# Patient Record
Sex: FEMALE | Race: WHITE | NOT HISPANIC OR LATINO | Employment: OTHER | URBAN - METROPOLITAN AREA
[De-identification: names, ages, dates, MRNs, and addresses within clinical notes are randomized per-mention and may not be internally consistent; named-entity substitution may affect disease eponyms.]

---

## 2018-03-20 ENCOUNTER — OFFICE VISIT (OUTPATIENT)
Dept: UROLOGY | Facility: MEDICAL CENTER | Age: 73
End: 2018-03-20
Payer: COMMERCIAL

## 2018-03-20 VITALS
HEIGHT: 64 IN | SYSTOLIC BLOOD PRESSURE: 124 MMHG | BODY MASS INDEX: 28.51 KG/M2 | DIASTOLIC BLOOD PRESSURE: 82 MMHG | WEIGHT: 167 LBS

## 2018-03-20 DIAGNOSIS — N20.0 KIDNEY STONE: Primary | ICD-10-CM

## 2018-03-20 DIAGNOSIS — R31.29 OTHER MICROSCOPIC HEMATURIA: ICD-10-CM

## 2018-03-20 PROBLEM — Z63.6 CAREGIVER STRESS: Status: ACTIVE | Noted: 2017-12-21

## 2018-03-20 LAB
SL AMB  POCT GLUCOSE, UA: ABNORMAL
SL AMB LEUKOCYTE ESTERASE,UA: ABNORMAL
SL AMB POCT BILIRUBIN,UA: ABNORMAL
SL AMB POCT BLOOD,UA: ABNORMAL
SL AMB POCT CLARITY,UA: ABNORMAL
SL AMB POCT COLOR,UA: YELLOW
SL AMB POCT KETONES,UA: ABNORMAL
SL AMB POCT NITRITE,UA: ABNORMAL
SL AMB POCT PH,UA: 7
SL AMB POCT SPECIFIC GRAVITY,UA: 1.02
SL AMB POCT URINE PROTEIN: ABNORMAL
SL AMB POCT UROBILINOGEN: 0.2

## 2018-03-20 PROCEDURE — 99214 OFFICE O/P EST MOD 30 MIN: CPT | Performed by: UROLOGY

## 2018-03-20 PROCEDURE — 81003 URINALYSIS AUTO W/O SCOPE: CPT | Performed by: UROLOGY

## 2018-03-20 PROCEDURE — 4040F PNEUMOC VAC/ADMIN/RCVD: CPT | Performed by: UROLOGY

## 2018-03-20 RX ORDER — LOSARTAN POTASSIUM 50 MG/1
50 TABLET ORAL DAILY
Refills: 1 | COMMUNITY
Start: 2018-02-21

## 2018-03-20 RX ORDER — ALPRAZOLAM 0.25 MG/1
TABLET ORAL
Refills: 1 | COMMUNITY
Start: 2018-01-02 | End: 2021-11-09

## 2018-03-20 RX ORDER — CHLORAL HYDRATE 500 MG
CAPSULE ORAL
COMMUNITY
Start: 2012-05-02

## 2018-03-20 RX ORDER — UBIDECARENONE 50 MG
600 CAPSULE ORAL
COMMUNITY
Start: 2014-11-04 | End: 2021-11-09

## 2018-03-20 RX ORDER — GLUCOSAMINE/MSM/CHONDROITIN A 500-83-400
30 TABLET ORAL
COMMUNITY

## 2018-03-20 RX ORDER — TRIAMCINOLONE ACETONIDE 1 MG/G
CREAM TOPICAL
Refills: 1 | COMMUNITY
Start: 2018-01-15 | End: 2021-11-09

## 2018-03-20 NOTE — PROGRESS NOTES
Assessment/Plan:    Kidney stone  Patient had an episode of flank and lower abdominal pain in February  A CT scan of the abdomen pelvis was done at that time that did not reveal any kidney stones or acute changes  Other microscopic hematuria  Improved       Diagnoses and all orders for this visit:    Kidney stone  -     POCT urine dip auto non-scope    Other microscopic hematuria    Other orders  -     ALPRAZolam (XANAX) 0 25 mg tablet; TAKE 1/2 TO 1 TABLET TWICE DAILY AS NEEDED FOR ANXIETY  -     losartan (COZAAR) 50 mg tablet; Take 50 mg by mouth daily  -     triamcinolone (KENALOG) 0 1 % cream; APPLY TWICE DAILY TO RASH ON BACK OF LEGS FOR 2 WEEKS  -     B Complex Vitamins (VITAMIN B COMPLEX PO); Take 1 capsule by mouth  -     Calcium Citrate-Vitamin D 250-200 MG-UNIT TABS; Take 1 tablet by mouth  -     co-enzyme Q-10 30 mg; Take 30 mg by mouth  -     Multiple Vitamins-Minerals (MULTIVITAMIN ADULTS PO); Take 1 capsule by mouth  -     Omega-3 Fatty Acids (FISH OIL) 1,000 mg; 2 gm once daily  -     Red Yeast Rice 600 MG TABS; Take 600 mg by mouth          Subjective:      Patient ID: Claudia Miranda is a 74 y o  female  HPI    The following portions of the patient's history were reviewed and updated as appropriate: allergies, current medications, past family history, past medical history, past social history, past surgical history and problem list     Review of Systems   Constitutional: Negative for activity change, appetite change, fatigue and fever  HENT: Negative  Eyes: Negative  Respiratory: Negative  Cardiovascular: Negative  Gastrointestinal: Negative for blood in stool, constipation, diarrhea and vomiting  Endocrine: Negative  Musculoskeletal: Negative  Skin: Negative  Allergic/Immunologic: Negative  Neurological: Negative  Hematological: Negative  Psychiatric/Behavioral: Negative            Objective:      /82 (BP Location: Right arm, Patient Position: Sitting)   Ht 5' 4" (1 626 m)   Wt 75 8 kg (167 lb)   BMI 28 67 kg/m²          Physical Exam   Constitutional: She is oriented to person, place, and time  She appears well-developed and well-nourished  HENT:   Head: Normocephalic and atraumatic  Eyes: Conjunctivae are normal    Neck: Neck supple  Cardiovascular: Normal rate  Pulmonary/Chest: Effort normal    Abdominal: Soft  She exhibits no distension and no mass  There is no tenderness  There is no rebound, no guarding and no CVA tenderness  No CVAT   Musculoskeletal: She exhibits no edema  Neurological: She is alert and oriented to person, place, and time  Skin: Skin is warm and dry  Psychiatric: She has a normal mood and affect  Her behavior is normal  Judgment and thought content normal    Vitals reviewed

## 2018-03-20 NOTE — ASSESSMENT & PLAN NOTE
Patient had an episode of flank and lower abdominal pain in February  A CT scan of the abdomen pelvis was done at that time that did not reveal any kidney stones or acute changes

## 2018-03-20 NOTE — PATIENT INSTRUCTIONS
Hematuria   WHAT YOU NEED TO KNOW:   What is hematuria? Hematuria is blood in your urine  Your urine may be bright red to dark brown  What other signs and symptoms might I have with hematuria? · Fever     · Nausea and vomiting     · Pain or bruising on your lower back or sides     · Pain when you urinate     · More urination than usual, or the need to urinate right away  What causes hematuria? Ask your healthcare provider for more information about these and other causes of hematuria:  · Urinary tract infection    · Kidney or bladder stones    · Swollen prostate    · Kidney disease    · Abdomen or pelvic injury    · Kidney, bladder, or prostate cancer    · Intense exercise  How is hematuria diagnosed? Your healthcare provider will ask when you first saw a change in the color of your urine  Tell him about any medical conditions or medicines you take  Some medicines can damage your kidneys or increase your risk for bleeding  You may need any of the following:  · Blood and urine tests  may show infection and how well your kidneys are working  · An x-ray, ultrasound, or CT  may show the cause of your hematuria  You may be given contrast liquid to help your urinary tract show up better in the pictures  Tell the healthcare provider if you have ever had an allergic reaction to contrast liquid  How is hematuria treated? Hematuria may go away without treatment  You may need medicines to treat an infection  Treatment depends on the cause of your hematuria  Ask your healthcare provider for more information about the treatment you may need  How can I manage my symptoms? Drink liquids as directed  You may need to drink extra liquids to help flush the blood from your body through your urine  Water is the best liquid to drink  Ask how much liquid to drink each day and which liquids are best for you  When should I seek immediate care? · You have blood in your urine after a new injury, such as a fall       · You are urinating very small amounts or not at all  · You feel like you cannot empty your bladder  · You have severe back or side pain that does not go away with treatment  When should I contact my healthcare provider? · You have a fever that gets worse or does not go away with treatment  · You cannot keep liquids or medicines down  · Your urine gets darker, even after you drink extra liquids  · You have questions or concerns about your condition, treatment, or care  CARE AGREEMENT:   You have the right to help plan your care  Learn about your health condition and how it may be treated  Discuss treatment options with your caregivers to decide what care you want to receive  You always have the right to refuse treatment  The above information is an  only  It is not intended as medical advice for individual conditions or treatments  Talk to your doctor, nurse or pharmacist before following any medical regimen to see if it is safe and effective for you  © 2017 [employer] Information is for End User's use only and may not be sold, redistributed or otherwise used for commercial purposes  All illustrations and images included in CareNotes® are the copyrighted property of [employer]  or [employer]

## 2019-01-29 RX ORDER — TETANUS TOXOID, REDUCED DIPHTHERIA TOXOID AND ACELLULAR PERTUSSIS VACCINE, ADSORBED 5; 2.5; 8; 8; 2.5 [IU]/.5ML; [IU]/.5ML; UG/.5ML; UG/.5ML; UG/.5ML
SUSPENSION INTRAMUSCULAR
Refills: 0 | COMMUNITY
Start: 2019-01-17 | End: 2021-11-09

## 2019-01-30 ENCOUNTER — OFFICE VISIT (OUTPATIENT)
Dept: UROLOGY | Facility: MEDICAL CENTER | Age: 74
End: 2019-01-30
Payer: COMMERCIAL

## 2019-01-30 VITALS
DIASTOLIC BLOOD PRESSURE: 78 MMHG | HEART RATE: 82 BPM | WEIGHT: 180 LBS | HEIGHT: 64 IN | BODY MASS INDEX: 30.73 KG/M2 | SYSTOLIC BLOOD PRESSURE: 130 MMHG

## 2019-01-30 DIAGNOSIS — R82.71 BACTERIURIA WITH PYURIA: Primary | ICD-10-CM

## 2019-01-30 DIAGNOSIS — R31.29 OTHER MICROSCOPIC HEMATURIA: ICD-10-CM

## 2019-01-30 DIAGNOSIS — R82.81 BACTERIURIA WITH PYURIA: Primary | ICD-10-CM

## 2019-01-30 LAB
SL AMB  POCT GLUCOSE, UA: ABNORMAL
SL AMB LEUKOCYTE ESTERASE,UA: ABNORMAL
SL AMB POCT BILIRUBIN,UA: ABNORMAL
SL AMB POCT BLOOD,UA: ABNORMAL
SL AMB POCT CLARITY,UA: CLEAR
SL AMB POCT COLOR,UA: YELLOW
SL AMB POCT KETONES,UA: ABNORMAL
SL AMB POCT NITRITE,UA: ABNORMAL
SL AMB POCT PH,UA: 6.5
SL AMB POCT SPECIFIC GRAVITY,UA: 1.02
SL AMB POCT URINE PROTEIN: ABNORMAL
SL AMB POCT UROBILINOGEN: 0.2

## 2019-01-30 PROCEDURE — 99213 OFFICE O/P EST LOW 20 MIN: CPT | Performed by: UROLOGY

## 2019-01-30 PROCEDURE — 81003 URINALYSIS AUTO W/O SCOPE: CPT | Performed by: UROLOGY

## 2019-01-30 PROCEDURE — 87086 URINE CULTURE/COLONY COUNT: CPT | Performed by: UROLOGY

## 2019-01-30 NOTE — ASSESSMENT & PLAN NOTE
See above  Old records are reviewed  No evidence of renal mass, stones or hydronephrosis are noted on CT scan without contrast last year  Results have been stable over many years dating back to a CT scan with and without contrast in 2014

## 2019-01-30 NOTE — ASSESSMENT & PLAN NOTE
Urinalysis is reviewed  Microscopic hematuria is also noted  This is likely chronic  In light of her history of kidney stones and upcoming knee replacement surgery we will evaluate with a renal ultrasound, urine culture and cystoscopy

## 2019-01-30 NOTE — PROGRESS NOTES
Assessment/Plan:    Bacteriuria with pyuria  Urinalysis is reviewed  Microscopic hematuria is also noted  This is likely chronic  In light of her history of kidney stones and upcoming knee replacement surgery we will evaluate with a renal ultrasound, urine culture and cystoscopy  Other microscopic hematuria  See above  Old records are reviewed  No evidence of renal mass, stones or hydronephrosis are noted on CT scan without contrast last year  Results have been stable over many years dating back to a CT scan with and without contrast in 2014  Diagnoses and all orders for this visit:    Bacteriuria with pyuria  -     POCT urine dip auto non-scope  -     Urine culture  -     US retroperitoneal complete; Future    Other microscopic hematuria  -     US retroperitoneal complete; Future  -     Cystoscopy; Future          Subjective:      Patient ID: Juliet Guevara is a 74 y o  female  Chief complaint:  Bacteriuria and microscopic hematuria    HPI:  77-year-old female noted to have bacteriuria and microscopic hematuria by her internist   Her urinalysis was repeated twice with similar findings  She has no significant voiding complaints  Her voiding symptoms are chronic and unchanged  She notes she voids with an adequate force and generally empties well  There is no gross hematuria, dysuria or symptoms of infection  She has occasional urgency  There is occasional urge incontinence  She is satisfied with her voiding pattern  She has no flank pain  The following portions of the patient's history were reviewed and updated as appropriate: allergies, current medications, past family history, past medical history, past social history, past surgical history and problem list     Review of Systems   Constitutional: Negative for activity change, appetite change, fatigue and fever  HENT: Negative  Eyes: Negative  Respiratory: Negative  Cardiovascular: Negative      Gastrointestinal: Negative  Negative for blood in stool, constipation, diarrhea and vomiting  Endocrine: Negative  Genitourinary:        See HPI   Musculoskeletal: Negative  Up coming total knee replacement   Skin: Negative  Allergic/Immunologic: Negative  Neurological: Negative  Hematological: Negative  Psychiatric/Behavioral: Negative  Objective:      /78 (BP Location: Left arm, Patient Position: Sitting)   Pulse 82   Ht 5' 4" (1 626 m)   Wt 81 6 kg (180 lb)   BMI 30 90 kg/m²          Physical Exam   Constitutional: She is oriented to person, place, and time  She appears well-developed and well-nourished  HENT:   Head: Normocephalic and atraumatic  Eyes: Conjunctivae are normal    Neck: Neck supple  Cardiovascular: Normal rate  Pulmonary/Chest: Effort normal    Abdominal: Soft  She exhibits no distension and no mass  There is no tenderness  There is no rebound, no guarding and no CVA tenderness  Musculoskeletal: She exhibits no edema  No CVA tenderness   Neurological: She is alert and oriented to person, place, and time  Skin: Skin is warm and dry  Psychiatric: She has a normal mood and affect   Her behavior is normal  Judgment and thought content normal

## 2019-01-30 NOTE — LETTER
January 30, 2019     Arnaud ChowcarlosDO  [address]    Patient: Ann Mas   YOB: 1945   Date of Visit: 1/30/2019       Dear Dr Lis Malloy: Thank you for referring Ann Mas to me for evaluation  Below are my notes for this consultation  If you have questions, please do not hesitate to call me  I look forward to following your patient along with you  Sincerely,        Maria G Cardenas MD        CC: No Recipients  Maria G Cardenas MD  1/30/2019 12:42 PM  Sign at close encounter  Assessment/Plan:    Bacteriuria with pyuria  Urinalysis is reviewed  Microscopic hematuria is also noted  This is likely chronic  In light of her history of kidney stones and upcoming knee replacement surgery we will evaluate with a renal ultrasound, urine culture and cystoscopy  Other microscopic hematuria  See above  Diagnoses and all orders for this visit:    Bacteriuria with pyuria  -     POCT urine dip auto non-scope  -     Urine culture  -     US retroperitoneal complete; Future    Other microscopic hematuria  -     US retroperitoneal complete; Future  -     Cystoscopy; Future          Subjective:      Patient ID: Ann Mas is a 74 y o  female  Chief complaint:  Bacteriuria and microscopic hematuria    HPI:  78-year-old female noted to have bacteriuria and microscopic hematuria by her internist   Her urinalysis was repeated twice with similar findings  She has no significant voiding complaints  Her voiding symptoms are chronic and unchanged  She notes she voids with an adequate force and generally empties well  There is no gross hematuria, dysuria or symptoms of infection  She has occasional urgency  There is occasional urge incontinence  She is satisfied with her voiding pattern  She has no flank pain          The following portions of the patient's history were reviewed and updated as appropriate: allergies, current medications, past family history, past medical history, past social history, past surgical history and problem list     Review of Systems   Constitutional: Negative for activity change, appetite change, fatigue and fever  HENT: Negative  Eyes: Negative  Respiratory: Negative  Cardiovascular: Negative  Gastrointestinal: Negative  Negative for blood in stool, constipation, diarrhea and vomiting  Endocrine: Negative  Genitourinary:        See HPI   Musculoskeletal: Negative  Up coming total knee replacement   Skin: Negative  Allergic/Immunologic: Negative  Neurological: Negative  Hematological: Negative  Psychiatric/Behavioral: Negative  Objective:      /78 (BP Location: Left arm, Patient Position: Sitting)   Pulse 82   Ht 5' 4" (1 626 m)   Wt 81 6 kg (180 lb)   BMI 30 90 kg/m²           Physical Exam   Constitutional: She is oriented to person, place, and time  She appears well-developed and well-nourished  HENT:   Head: Normocephalic and atraumatic  Eyes: Conjunctivae are normal    Neck: Neck supple  Cardiovascular: Normal rate  Pulmonary/Chest: Effort normal    Abdominal: Soft  She exhibits no distension and no mass  There is no tenderness  There is no rebound, no guarding and no CVA tenderness  Musculoskeletal: She exhibits no edema  No CVA tenderness   Neurological: She is alert and oriented to person, place, and time  Skin: Skin is warm and dry  Psychiatric: She has a normal mood and affect   Her behavior is normal  Judgment and thought content normal

## 2019-01-30 NOTE — PATIENT INSTRUCTIONS
Hematuria   WHAT YOU NEED TO KNOW:   Hematuria is blood in your urine  Your urine may be bright red to dark brown  DISCHARGE INSTRUCTIONS:   Return to the emergency department if:   · You have blood in your urine after a new injury, such as a fall  · You are urinating very small amounts or not at all  · You feel like you cannot empty your bladder  · You have severe back or side pain that does not go away with treatment  Contact your healthcare provider if:   · You have a fever that gets worse or does not go away with treatment  · You cannot keep liquids or medicines down  · Your urine gets darker, even after you drink extra liquids  · You have questions or concerns about your condition, treatment, or care  Drink liquids as directed: You may need to drink extra liquids to help flush the blood from your body through your urine  Water is the best liquid to drink  Ask how much liquid to drink each day and which liquids are best for you  Follow up with your healthcare provider as directed:  Write down your questions so you remember to ask them during your visits  © 2017 [facility] Information is for End User's use only and may not be sold, redistributed or otherwise used for commercial purposes  All illustrations and images included in CareNotes® are the copyrighted property of [employer]  or [employer]  The above information is an  only  It is not intended as medical advice for individual conditions or treatments  Talk to your doctor, nurse or pharmacist before following any medical regimen to see if it is safe and effective for you

## 2019-02-01 ENCOUNTER — HOSPITAL ENCOUNTER (OUTPATIENT)
Dept: ULTRASOUND IMAGING | Facility: MEDICAL CENTER | Age: 74
Discharge: HOME | End: 2019-02-01
Attending: UROLOGY
Payer: COMMERCIAL

## 2019-02-01 DIAGNOSIS — R82.71 BACTERIURIA WITH PYURIA: ICD-10-CM

## 2019-02-01 DIAGNOSIS — R82.81 BACTERIURIA WITH PYURIA: ICD-10-CM

## 2019-02-01 DIAGNOSIS — R31.29 OTHER MICROSCOPIC HEMATURIA: ICD-10-CM

## 2019-02-01 LAB — BACTERIA UR CULT: NORMAL

## 2019-02-01 PROCEDURE — 76770 US EXAM ABDO BACK WALL COMP: CPT

## 2019-02-04 ENCOUNTER — TELEPHONE (OUTPATIENT)
Dept: UROLOGY | Facility: MEDICAL CENTER | Age: 74
End: 2019-02-04

## 2019-02-06 ENCOUNTER — TELEPHONE (OUTPATIENT)
Dept: UROLOGY | Facility: MEDICAL CENTER | Age: 74
End: 2019-02-06

## 2019-02-06 NOTE — TELEPHONE ENCOUNTER
----- Message from Maria E Sanders MD sent at 2/5/2019  7:49 PM EST -----  Please call the patient regarding her abnormal result  Bilateral renal cysts are seen  These are common in people over age 48  She should keep her cystoscopy appointment

## 2019-03-11 ENCOUNTER — PROCEDURE VISIT (OUTPATIENT)
Dept: UROLOGY | Facility: MEDICAL CENTER | Age: 74
End: 2019-03-11
Payer: COMMERCIAL

## 2019-03-11 VITALS — HEART RATE: 60 BPM | SYSTOLIC BLOOD PRESSURE: 130 MMHG | DIASTOLIC BLOOD PRESSURE: 80 MMHG

## 2019-03-11 DIAGNOSIS — R31.29 MICROHEMATURIA: Primary | ICD-10-CM

## 2019-03-11 LAB
SL AMB  POCT GLUCOSE, UA: ABNORMAL
SL AMB LEUKOCYTE ESTERASE,UA: ABNORMAL
SL AMB POCT BILIRUBIN,UA: ABNORMAL
SL AMB POCT BLOOD,UA: ABNORMAL
SL AMB POCT CLARITY,UA: CLEAR
SL AMB POCT COLOR,UA: YELLOW
SL AMB POCT KETONES,UA: ABNORMAL
SL AMB POCT NITRITE,UA: ABNORMAL
SL AMB POCT PH,UA: 5.5
SL AMB POCT SPECIFIC GRAVITY,UA: 1.03
SL AMB POCT URINE PROTEIN: ABNORMAL
SL AMB POCT UROBILINOGEN: 0.2

## 2019-03-11 PROCEDURE — 52000 CYSTOURETHROSCOPY: CPT | Performed by: UROLOGY

## 2019-03-11 PROCEDURE — 81003 URINALYSIS AUTO W/O SCOPE: CPT | Performed by: UROLOGY

## 2019-03-11 RX ORDER — CELECOXIB 200 MG/1
CAPSULE ORAL
Refills: 0 | COMMUNITY
Start: 2019-02-19 | End: 2021-11-09

## 2019-03-11 RX ORDER — AZELASTINE HCL 205.5 UG/1
SPRAY NASAL EVERY 24 HOURS
COMMUNITY
Start: 2015-12-28 | End: 2021-11-09

## 2019-03-11 RX ORDER — ACETAMINOPHEN 500 MG
500 TABLET ORAL EVERY 6 HOURS PRN
COMMUNITY

## 2019-03-11 NOTE — PROGRESS NOTES
Cystoscopy  Date/Time: 3/11/2019 11:54 AM  Performed by: Price Leigh MD  Authorized by: Price Leigh MD     Procedure details: cystoscopy    Patient tolerance: Patient tolerated the procedure well with no immediate complications    Additional Procedure Details: Cystoscopy Procedure Note        Pre-operative Diagnosis: Hematuria    Post-operative Diagnosis:  Atrophic changes urethra        Procedure Details   The risks, benefits, complications, treatment options, and expected outcomes were discussed with the patient  The patient concurred with the proposed plan, giving informed consent  Cystoscopy was performed today under local anesthesia, using sterile technique  The patient was placed in the lithotomy  position, prepped and draped in the usual sterile fashion  A 15 [place] flexible cystoscope  was used to inspect both the urethra and bladder  Findings:  Normal bladder and orifices, mild atrophic changes in the trigone and urethra  There is no evidence of bladder stone or tumor  Specimens:  Urinalysis reveals moderate blood on dipstick  Leukocytes are also noted  Discussion:  A renal ultrasound reveals bilateral renal cysts  Cystoscopy reveals no evidence of bladder lesion  I recommend to the patient she have a urine culture done tomorrow as she will be having surgery next week (total knee replacement)  We can then treat a positive urine culture prior to her surgery  The remainder of her workup is unremarkable and she is satisfied her voiding pattern  She will return in 1 year

## 2019-03-11 NOTE — PATIENT INSTRUCTIONS

## 2019-03-11 NOTE — LETTER
March 11, 2019     Nadege Guerrier DO  [address] [facility] [address]    Patient: Beth Membreno   YOB: 1945   Date of Visit: 3/11/2019       Dear Dr Colin Calhoun: Thank you for referring Beth Membreno to me for evaluation  Below are my notes for this consultation  If you have questions, please do not hesitate to call me  I look forward to following your patient along with you  Sincerely,        Kal Sheffield MD        CC: No Recipients  Kal Sheffield MD  3/11/2019 12:07 PM  Sign at close encounter  Cystoscopy  Date/Time: 3/11/2019 11:54 AM  Performed by: Kal Sheffield MD  Authorized by: Kal Sheffield MD     Procedure details: cystoscopy    Patient tolerance: Patient tolerated the procedure well with no immediate complications    Additional Procedure Details: Cystoscopy Procedure Note        Pre-operative Diagnosis: Hematuria    Post-operative Diagnosis:  Atrophic changes urethra        Procedure Details   The risks, benefits, complications, treatment options, and expected outcomes were discussed with the patient  The patient concurred with the proposed plan, giving informed consent  Cystoscopy was performed today under local anesthesia, using sterile technique  The patient was placed in the lithotomy  position, prepped and draped in the usual sterile fashion  A 15 [employer] flexible cystoscope  was used to inspect both the urethra and bladder  Findings:  Normal bladder and orifices, mild atrophic changes in the trigone and urethra  There is no evidence of bladder stone or tumor  Specimens:  Urinalysis reveals moderate blood on dipstick  Leukocytes are also noted  Discussion:  A renal ultrasound reveals bilateral renal cysts  Cystoscopy reveals no evidence of bladder lesion  I recommend to the patient she have a urine culture done tomorrow as she will be having surgery next week (total knee replacement)    We can then treat a positive urine culture prior to her surgery  The remainder of her workup is unremarkable and she is satisfied her voiding pattern  She will return in 1 year

## 2019-03-12 ENCOUNTER — APPOINTMENT (OUTPATIENT)
Dept: LAB | Facility: MEDICAL CENTER | Age: 74
End: 2019-03-12
Attending: UROLOGY
Payer: COMMERCIAL

## 2019-03-12 DIAGNOSIS — R31.29 MICROHEMATURIA: ICD-10-CM

## 2019-03-12 PROCEDURE — 87086 URINE CULTURE/COLONY COUNT: CPT

## 2019-03-14 LAB — BACTERIA UR CULT: NORMAL

## 2019-03-15 ENCOUNTER — TELEPHONE (OUTPATIENT)
Dept: UROLOGY | Facility: MEDICAL CENTER | Age: 74
End: 2019-03-15

## 2019-03-15 NOTE — TELEPHONE ENCOUNTER
----- Message from Jeana Yost MD sent at 3/14/2019  4:53 PM EDT -----  Please call the patient regarding her abnormal result  The urine test was mixed/ contaminated  If she is not having any dysuria or urinary symptoms I do not feel she requires antibiotic therapy at this time

## 2020-04-20 ENCOUNTER — TELEPHONE (OUTPATIENT)
Dept: UROLOGY | Facility: CLINIC | Age: 75
End: 2020-04-20

## 2020-04-27 ENCOUNTER — TELEMEDICINE (OUTPATIENT)
Dept: UROLOGY | Facility: CLINIC | Age: 75
End: 2020-04-27
Payer: COMMERCIAL

## 2020-04-27 DIAGNOSIS — N20.0 KIDNEY STONE: ICD-10-CM

## 2020-04-27 DIAGNOSIS — R31.29 MICROHEMATURIA: ICD-10-CM

## 2020-04-27 DIAGNOSIS — R31.29 MICROSCOPIC HEMATURIA: Primary | ICD-10-CM

## 2020-04-27 PROBLEM — R82.81 BACTERIURIA WITH PYURIA: Status: RESOLVED | Noted: 2019-01-30 | Resolved: 2020-04-27

## 2020-04-27 PROBLEM — R82.71 BACTERIURIA WITH PYURIA: Status: RESOLVED | Noted: 2019-01-30 | Resolved: 2020-04-27

## 2020-04-27 PROCEDURE — 99213 OFFICE O/P EST LOW 20 MIN: CPT | Performed by: PHYSICIAN ASSISTANT

## 2020-10-01 ENCOUNTER — APPOINTMENT (OUTPATIENT)
Dept: RADIOLOGY | Facility: MEDICAL CENTER | Age: 75
End: 2020-10-01
Payer: COMMERCIAL

## 2020-10-01 ENCOUNTER — TRANSCRIBE ORDERS (OUTPATIENT)
Dept: ADMINISTRATIVE | Facility: HOSPITAL | Age: 75
End: 2020-10-01

## 2020-10-01 DIAGNOSIS — R09.82 POST-NASAL DRIP: ICD-10-CM

## 2020-10-01 DIAGNOSIS — R09.82 POSTNASAL DRIP: Primary | ICD-10-CM

## 2020-10-01 PROCEDURE — 70220 X-RAY EXAM OF SINUSES: CPT

## 2021-02-12 DIAGNOSIS — Z23 ENCOUNTER FOR IMMUNIZATION: ICD-10-CM

## 2021-11-09 RX ORDER — ZOSTER VACCINE RECOMBINANT, ADJUVANTED 50 MCG/0.5
KIT INTRAMUSCULAR
COMMUNITY

## 2021-11-10 ENCOUNTER — OFFICE VISIT (OUTPATIENT)
Dept: UROLOGY | Facility: MEDICAL CENTER | Age: 76
End: 2021-11-10
Payer: COMMERCIAL

## 2021-11-10 VITALS
BODY MASS INDEX: 29.88 KG/M2 | SYSTOLIC BLOOD PRESSURE: 120 MMHG | DIASTOLIC BLOOD PRESSURE: 76 MMHG | WEIGHT: 175 LBS | HEIGHT: 64 IN | HEART RATE: 72 BPM

## 2021-11-10 DIAGNOSIS — R31.29 MICROSCOPIC HEMATURIA: Primary | ICD-10-CM

## 2021-11-10 DIAGNOSIS — R10.9 LEFT FLANK PAIN: ICD-10-CM

## 2021-11-10 LAB
BACTERIA UR QL AUTO: ABNORMAL /HPF
BILIRUB UR QL STRIP: NEGATIVE
CLARITY UR: ABNORMAL
COLOR UR: YELLOW
GLUCOSE UR STRIP-MCNC: NEGATIVE MG/DL
HGB UR QL STRIP.AUTO: ABNORMAL
KETONES UR STRIP-MCNC: NEGATIVE MG/DL
LEUKOCYTE ESTERASE UR QL STRIP: ABNORMAL
NITRITE UR QL STRIP: NEGATIVE
NON-SQ EPI CELLS URNS QL MICRO: ABNORMAL /HPF
PH UR STRIP.AUTO: 6 [PH]
PROT UR STRIP-MCNC: ABNORMAL MG/DL
RBC #/AREA URNS AUTO: ABNORMAL /HPF
SL AMB  POCT GLUCOSE, UA: ABNORMAL
SL AMB LEUKOCYTE ESTERASE,UA: ABNORMAL
SL AMB POCT BILIRUBIN,UA: ABNORMAL
SL AMB POCT BLOOD,UA: ABNORMAL
SL AMB POCT CLARITY,UA: CLEAR
SL AMB POCT COLOR,UA: YELLOW
SL AMB POCT KETONES,UA: ABNORMAL
SL AMB POCT NITRITE,UA: ABNORMAL
SL AMB POCT PH,UA: 6
SL AMB POCT SPECIFIC GRAVITY,UA: 1.02
SL AMB POCT URINE PROTEIN: ABNORMAL
SL AMB POCT UROBILINOGEN: 0.2
SP GR UR STRIP.AUTO: 1.02 (ref 1–1.03)
UROBILINOGEN UR QL STRIP.AUTO: 1 E.U./DL
WBC #/AREA URNS AUTO: ABNORMAL /HPF

## 2021-11-10 PROCEDURE — 81003 URINALYSIS AUTO W/O SCOPE: CPT | Performed by: PHYSICIAN ASSISTANT

## 2021-11-10 PROCEDURE — 81001 URINALYSIS AUTO W/SCOPE: CPT | Performed by: PHYSICIAN ASSISTANT

## 2021-11-10 PROCEDURE — 99214 OFFICE O/P EST MOD 30 MIN: CPT | Performed by: PHYSICIAN ASSISTANT

## 2021-11-10 RX ORDER — ROSUVASTATIN CALCIUM 10 MG/1
TABLET, COATED ORAL
COMMUNITY
Start: 2021-10-07

## 2021-11-10 RX ORDER — CEPHALEXIN 500 MG/1
CAPSULE ORAL
COMMUNITY
Start: 2021-11-02

## 2021-11-10 RX ORDER — GABAPENTIN 100 MG/1
CAPSULE ORAL
COMMUNITY
Start: 2020-12-20

## 2021-11-10 RX ORDER — BETAMETHASONE DIPROPIONATE 0.5 MG/G
CREAM TOPICAL
COMMUNITY
Start: 2021-11-03

## 2021-11-17 ENCOUNTER — HOSPITAL ENCOUNTER (OUTPATIENT)
Dept: ULTRASOUND IMAGING | Facility: MEDICAL CENTER | Age: 76
Discharge: HOME | End: 2021-11-17
Payer: COMMERCIAL

## 2021-11-17 DIAGNOSIS — R10.9 LEFT FLANK PAIN: ICD-10-CM

## 2021-11-17 PROCEDURE — 76770 US EXAM ABDO BACK WALL COMP: CPT

## 2022-09-13 DIAGNOSIS — R09.82 POST-NASAL DRIP: Primary | ICD-10-CM

## 2022-09-13 RX ORDER — GABAPENTIN 300 MG/1
CAPSULE ORAL
Qty: 3 CAPSULE | Refills: 0 | Status: SHIPPED | OUTPATIENT
Start: 2022-09-13

## 2023-06-19 ENCOUNTER — CONSULT (OUTPATIENT)
Dept: PLASTIC SURGERY | Facility: CLINIC | Age: 78
End: 2023-06-19
Payer: COMMERCIAL

## 2023-06-19 VITALS
WEIGHT: 178 LBS | HEIGHT: 64 IN | SYSTOLIC BLOOD PRESSURE: 143 MMHG | HEART RATE: 73 BPM | BODY MASS INDEX: 30.39 KG/M2 | DIASTOLIC BLOOD PRESSURE: 85 MMHG | TEMPERATURE: 97.8 F

## 2023-06-19 DIAGNOSIS — I10 HYPERTENSION, ESSENTIAL: ICD-10-CM

## 2023-06-19 DIAGNOSIS — Z85.3 HISTORY OF BREAST CANCER: ICD-10-CM

## 2023-06-19 DIAGNOSIS — T85.43XA RUPTURED LEFT BREAST IMPLANT, INITIAL ENCOUNTER: Primary | ICD-10-CM

## 2023-06-19 DIAGNOSIS — Z90.13 STATUS POST BILATERAL MASTECTOMY: ICD-10-CM

## 2023-06-19 DIAGNOSIS — I89.0 LYMPHEDEMA OF LEFT ARM: ICD-10-CM

## 2023-06-19 DIAGNOSIS — Z98.890 STATUS POST BILATERAL BREAST RECONSTRUCTION: ICD-10-CM

## 2023-06-19 DIAGNOSIS — N65.1 BREAST ASYMMETRY FOLLOWING RECONSTRUCTIVE SURGERY: ICD-10-CM

## 2023-06-19 DIAGNOSIS — T85.44XA CAPSULAR CONTRACTURE OF BREAST IMPLANT, INITIAL ENCOUNTER: ICD-10-CM

## 2023-06-19 DIAGNOSIS — C84.A8 CUTANEOUS T-CELL LYMPHOMA INVOLVING LYMPH NODES OF MULTIPLE REGIONS (HCC): ICD-10-CM

## 2023-06-19 PROCEDURE — 99205 OFFICE O/P NEW HI 60 MIN: CPT | Performed by: PLASTIC SURGERY

## 2023-06-19 RX ORDER — MULTIVIT-MIN/IRON/FOLIC ACID/K 18-600-40
CAPSULE ORAL
COMMUNITY
Start: 2022-10-31

## 2023-06-19 RX ORDER — BLACK COHOSH ROOT EXTRACT 80 MG
CAPSULE ORAL
COMMUNITY

## 2023-06-19 RX ORDER — GINGER ROOT/GINGER ROOT EXT 262.5 MG
CAPSULE ORAL
COMMUNITY

## 2023-06-19 RX ORDER — BUPIVACAINE HYDROCHLORIDE 5 MG/ML
1 INJECTION, SOLUTION PERINEURAL
COMMUNITY

## 2023-06-19 RX ORDER — PRENATAL 168/IRON/FOLIC/OMEGA3 27-800-235
CAPSULE ORAL
COMMUNITY

## 2023-06-19 RX ORDER — NIACINAMIDE 500 MG
TABLET ORAL
COMMUNITY
Start: 2021-03-01

## 2023-06-19 NOTE — PROGRESS NOTES
Plastic Surgery H&P  [address] [place] [address], [address]      Assessment/Plan:    Assessment:  1  Left breast ruptured implant  2  Right breast capsular contracture, grade II- III  3  Hx of left breast cancer  4  S/P B/L mastectomies with implant reconstruction  5  Left arm lymphedema  6  Cutaneous Non-hodgkins T cell lymphoma     Plan:  Patient presents with Left breast ruptured implant and right breast capsular contracture, grade II- III  We had a lengthy discussion regarding surgical options  We discussed 3 potential options  I discussed that as she is currently asymptomatic and the rupture is intracapsular, we could continue close observation for now  Another option would be to proceed with surgical intervention with bilateral implant removal and total open capsulectomy either with or without replacement of the bilateral breast implants  I discussed each surgical procedure including operative and recovery time as well as all potential risks, versus benefits and alternatives  We discussed the risk versus benefits of surgical intervention at length, given her age and medical comorbidities  The patient noted her understanding of all the options  She had opportunity for questions  All were answered  She will think about her options and discuss with her family  She will let us know how she decides to proceed  She is welcome to call or return with any additional questions or concerns  Subjective      Kym Haney is a [de-identified] y o  female who is referred by Dr Lucita Jackson in consultation for evaluation of breast asymmetry following breast reconstruction  Patient has history of left breast cancer diagnosed in 2006  She underwent bilateral mastectomies  She then had expander and implant reconstruction in 2007  The implants have been in place since then  She has been getting MRIs every 3 years    She recently had her bilateral breast MRI which demonstrated a left breast intracapsular rupture  When she went back to look at the report 3 years ago, the implant was noted to be ruptured then  However, she was told everything was okay  Most recently, patient states she has noticed a change in the shape and position of the right breast implant over the last 6 months  She also reports occasional pain and discomfort of the right breast   She presents today for evaluation      Bilateral breast implants: [place] smooth round high profile gel, reference number: [id number]    Past Medical History:   Diagnosis Date   • Adult T-cell leukemia/lymphoma of skin ([facility] [address] ) 01/2021   • Breast cancer ([facility] [address] ) 2006   • Colon cancer ([facility] [address] ) 2006   • Interstitial cystitis (chronic) with hematuria    • Kidney stone    • Microscopic hematuria      Past Surgical History:   Procedure Laterality Date   • COLON SIGMOID RESECTION     • CRYOTHERAPY Bilateral 10/04/2022    posterior nasal nerve - Clarifix - Dr Abbasi - office procedure   • CYSTOSCOPY     • EXTRACORPOREAL SHOCK WAVE LITHOTRIPSY      Renal   • MASTECTOMY, RADICAL Bilateral    • REPLACEMENT TOTAL KNEE Right [id number]/[id number]    Dr Antolin Nino   second surgery 6/2020 knee replacement removed   • SHOULDER SURGERY Right 01/2020    rotator cuff repair       Current Outpatient Medications:   •  acetaminophen (TYLENOL) 500 mg tablet, Take 500 mg by mouth every 6 (six) hours as needed, Disp: , Rfl:   •  Ascorbic Acid (Vitamin C) 500 MG CAPS, , Disp: , Rfl:   •  B Complex Vitamins (VITAMIN B COMPLEX PO), Take 1 capsule by mouth, Disp: , Rfl:   •  Calcium Carb-Cholecalciferol 600-20 MG-MCG TABS, , Disp: , Rfl:   •  Calcium Citrate-Vitamin D 250-200 MG-UNIT TABS, Take 1 tablet by mouth, Disp: , Rfl:   •  Calcium-Phosphorus-Vitamin D (Citracal +D3) 250-107-500 MG-MG-UNIT CHEW, , Disp: , Rfl:   •  cephalexin (KEFLEX) 500 mg capsule, , Disp: , Rfl:   •  co-enzyme Q-10 30 mg, Take 30 mg by mouth, Disp: , Rfl:   •  cyanocobalamin (VITAMIN B-12) 500 MCG tablet, , Disp: , Rfl:   •  gabapentin (NEURONTIN) 100 mg capsule, gabapentin 100 mg capsule  TAKE 1 CAPSULE BY MOUTH THREE TIMES A DAY, Disp: , Rfl:   •  losartan (COZAAR) 50 mg tablet, Take 50 mg by mouth daily, Disp: , Rfl: 1  •  Multiple Vitamins-Minerals (MULTIVITAMIN ADULTS PO), Take 1 capsule by mouth, Disp: , Rfl:   •  niacinamide 500 mg tablet, , Disp: , Rfl:   •  rosuvastatin (CRESTOR) 10 MG tablet, , Disp: , Rfl:   •  triamcinolone (KENALOG) 0 1 % ointment, APPLY TO AFFECTED AREA(S) TWICE DAILY X 2 WEEKS, THEN 2 DAYS PER WEEK AS NEEDED , Disp: , Rfl:   •  betamethasone, augmented, (DIPROLENE-AF) 0 05 % cream, , Disp: , Rfl:   •  bupivacaine (MARCAINE) 0 5 %, 1 mL (Patient not taking: Reported on 6/19/2023), Disp: , Rfl:   •  fluticasone (FLONASE) 50 mcg/act nasal spray, SPRAY 2 SPRAYS INTO EACH NOSTRIL EVERY DAY, Disp: 48 mL, Rfl: 3  •  gabapentin (NEURONTIN) 300 mg capsule, Take three capsules 4 hours prior to the procedure, Disp: 3 capsule, Rfl: 0  •  ipratropium (ATROVENT) 0 06 % nasal spray, 2 sprays into each nostril 4 (four) times a day, Disp: 15 mL, Rfl: 11  •  Omega 3-6-9 Fatty Acids (Omega 3-6-9 Complex) CAPS, Take by mouth, Disp: , Rfl:   •  Omega-3 Fatty Acids (FISH OIL) 1,000 mg, 2 gm once daily, Disp: , Rfl:   •  Zoster Vac Recomb Adjuvanted (Shingrix) 50 MCG/0 5ML SUSR, Shingrix (PF) 50 mcg/0 5 mL intramuscular suspension, kit (Patient not taking: No sig reported), Disp: , Rfl:         Allergies   Allergen Reactions   • Estrogens      Other reaction(s): avoid given ER positive breast CA         Family History   Problem Relation Age of Onset   • Breast cancer Mother    • Colon cancer Mother    • Kidney disease Father          Social History     Socioeconomic History   • Marital status:       Spouse name: Not on file   • Number of children: Not on file   • Years of education: Not on file   • Highest education level: Not on file   Occupational History   • Occupation: medical "   Tobacco Use   • Smoking status: Never   • Smokeless tobacco: Never   Vaping Use   • Vaping Use: Never used   Substance and Sexual Activity   • Alcohol use: No   • Drug use: No   • Sexual activity: Not on file   Other Topics Concern   • Not on file   Social History Narrative   • Not on file     Social Determinants of Health     Financial Resource Strain: Not on file   Food Insecurity: Not on file   Transportation Needs: Not on file   Physical Activity: Not on file   Stress: Not on file   Social Connections: Not on file   Intimate Partner Violence: Not on file   Housing Stability: Not on file       Review of Systems  Pertinent items are noted in HPI  Objective     /85   Pulse 73   Temp 97 8 °F (36 6 °C)   Ht 5' 4\" (1 626 m)   Wt 80 7 kg (178 lb)   BMI 30 55 kg/m²     General:  alert and oriented, in no acute distress   Skin:  normal and LUE lumphedema with sleeve in place   Eyes: conjunctivae/corneas clear  PERRL, EOM's intact  Fundi benign  Mouth: MMM no lesions   Lymph Nodes:  Cervical, supraclavicular, and axillary nodes normal    Lungs:  clear to auscultation bilaterally   Heart:  regular rate and rhythm, S1, S2 normal, no murmur, click, rub or gallop and regular rate and rhythm   Abdomen: soft, non-tender; bowel sounds normal; no masses,  no organomegaly   CVA:  absent   Genitourinary: defer exam   Extremities:  extremities normal, warm and well-perfused; no cyanosis, clubbing, or edema   Neurologic:  Alert and oriented x3  Gait normal  Reflexes and motor strength normal and symmetric  Cranial nerves 2-12 and sensation grossly intact     Psychiatric:  normal mood, behavior, speech, dress, and thought processes      Right breast: Soft, nontender, implant in place, relatively immobile, no masses palpable, implant sits lower with less projection on the chest wall  Left breast: Soft, nontender, implant in place, relatively immobile, no masses palpable, implant sits higher with more " projection on the chest wall  Imaging:     HISTORY:  Patient is [de-identified]years old and is seen for breast MRI,personal history of  breast cancer in the left breast, possible implant rupture in the right breast, high risk screening in both breasts and strong family history in  both breasts  The patient has a history of left mastectomy in May, 2006 - malignant and right mastectomy in May, 2006 - benign  The patient has a history of left breast cancer in 2006  The patient has the following  family history of breast cancer: mother, at age 40 and maternal  grandmother, at age 79  FILMS COMPARED:  The present examination has been compared to prior imaging studies  BREAST MRI:  An MRI Breast Bilateral w/wo Contrast was performed according to the  implant protocol including axial T1 VIBRANT, axial and sagittal STIR-W saturation, Sagittal Vibrant post, and axial VIBRANT pre and post  uneventful administration of 18cc Dotarem  The breasts contain the following amount of fibroglandular tissue: almost entirely fat  There is no significant background parenchymal enhancement  There are bilateral silicone gel implants  The patient is status post left mastectomy and prophylactic right  mastectomy with bilateral breast reconstruction  There are bilateral retropectoral silicone gel implants  There is evidence of intracapsular rupture involving the left implant  There is no definite area of abnormal enhancement within either   reconstructed breast  There is no axillary lymphadenopathy  IMPRESSION:  IMPRESSION:  Status post bilateral mastectomies with bilateral breast reconstructive surgery  No MRI evidence of malignancy in either reconstructed breast     Findings in the left breast implant suggestive of intracapsular rupture      BI-RADS Category 1:  Negative      WS Code: LVCC     Imaging Results - MRI BREAST BILATERAL WWO CONTRAST (05/22/2023 8:18 PM EDT)  Authorizing Provider Result Type   Ba[facility]END[facility] MRI ORDERABLES         I independently reviewed and interpreted the above laboratory and imaging data  A total of 60 mins was spent on the encounter, including reviewing the patient's records, documentation, and time spent in counseling coordination of care which represent greater than 50% of the visit  35 mins was spent in counseling and discussion of surgical procedures and options